# Patient Record
Sex: MALE | Race: BLACK OR AFRICAN AMERICAN | Employment: UNEMPLOYED | URBAN - METROPOLITAN AREA
[De-identification: names, ages, dates, MRNs, and addresses within clinical notes are randomized per-mention and may not be internally consistent; named-entity substitution may affect disease eponyms.]

---

## 2017-07-27 ENCOUNTER — APPOINTMENT (OUTPATIENT)
Dept: GENERAL RADIOLOGY | Age: 22
End: 2017-07-27
Attending: PHYSICIAN ASSISTANT
Payer: SELF-PAY

## 2017-07-27 ENCOUNTER — HOSPITAL ENCOUNTER (EMERGENCY)
Age: 22
Discharge: HOME | End: 2017-07-27
Attending: EMERGENCY MEDICINE
Payer: SELF-PAY

## 2017-07-27 VITALS
BODY MASS INDEX: 21 KG/M2 | DIASTOLIC BLOOD PRESSURE: 73 MMHG | RESPIRATION RATE: 18 BRPM | SYSTOLIC BLOOD PRESSURE: 134 MMHG | HEIGHT: 71 IN | TEMPERATURE: 99.1 F | OXYGEN SATURATION: 99 % | HEART RATE: 72 BPM | WEIGHT: 150 LBS

## 2017-07-27 DIAGNOSIS — M25.511 ACUTE PAIN OF RIGHT SHOULDER: Primary | ICD-10-CM

## 2017-07-27 PROCEDURE — L3670 SO ACRO/CLAV CAN WEB PRE OTS: HCPCS

## 2017-07-27 PROCEDURE — 73030 X-RAY EXAM OF SHOULDER: CPT

## 2017-07-27 PROCEDURE — 99283 EMERGENCY DEPT VISIT LOW MDM: CPT

## 2017-07-27 RX ORDER — IBUPROFEN 600 MG/1
600 TABLET ORAL
Qty: 20 TAB | Refills: 0 | Status: SHIPPED | OUTPATIENT
Start: 2017-07-27 | End: 2017-10-10

## 2017-07-27 NOTE — LETTER
[facility] EMERGENCY DEPT 
[address] [facility] [address] 
[phone] Work/School Note Date: 7/27/2017 To Whom It May concern: Debi Ponce was seen and treated today in the emergency room by the following provider(s): 
Attending Provider: Latricia Silverman DO Physician Assistant: FATMATA Jansen. Debi Ponce may return to work on 07/31/17. Sincerely, 
 
 
 
 
Triny Landers

## 2017-07-28 NOTE — DISCHARGE INSTRUCTIONS
Shoulder Pain: Care Instructions  Your Care Instructions    You can hurt your shoulder by using it too much during an activity, such as fishing or baseball. It can also happen as part of the everyday wear and tear of getting older. Shoulder injuries can be slow to heal, but your shoulder should get better with time. Your doctor may recommend a sling to rest your shoulder. If you have injured your shoulder, you may need testing and treatment. Follow-up care is a key part of your treatment and safety. Be sure to make and go to all appointments, and call your doctor if you are having problems. It's also a good idea to know your test results and keep a list of the medicines you take. How can you care for yourself at home? · Take pain medicines exactly as directed. ¨ If the doctor gave you a prescription medicine for pain, take it as prescribed. ¨ If you are not taking a prescription pain medicine, ask your doctor if you can take an over-the-counter medicine. ¨ Do not take two or more pain medicines at the same time unless the doctor told you to. Many pain medicines contain acetaminophen, which is Tylenol. Too much acetaminophen (Tylenol) can be harmful. · If your doctor recommends that you wear a sling, use it as directed. Do not take it off before your doctor tells you to. · Put ice or a cold pack on the sore area for 10 to 20 minutes at a time. Put a thin cloth between the ice and your skin. · If there is no swelling, you can put moist heat, a heating pad, or a warm cloth on your shoulder. Some doctors suggest alternating between hot and cold. · Rest your shoulder for a few days. If your doctor recommends it, you can then begin gentle exercise of the shoulder, but do not lift anything heavy. When should you call for help? Call 911 anytime you think you may need emergency care. For example, call if:  · You have chest pain or pressure. This may occur with:  ¨ Sweating. ¨ Shortness of breath.   ¨ Nausea or vomiting. ¨ Pain that spreads from the chest to the neck, jaw, or one or both shoulders or arms. ¨ Dizziness or lightheadedness. ¨ A fast or uneven pulse. After calling 911, chew 1 adult-strength aspirin. Wait for an ambulance. Do not try to drive yourself. · Your arm or hand is cool or pale or changes color. Call your doctor now or seek immediate medical care if:  · You have signs of infection, such as:  ¨ Increased pain, swelling, warmth, or redness in your shoulder. ¨ Red streaks leading from a place on your shoulder. ¨ Pus draining from an area of your shoulder. ¨ Swollen lymph nodes in your neck, armpits, or groin. ¨ A fever. Watch closely for changes in your health, and be sure to contact your doctor if:  · You cannot use your shoulder. · Your shoulder does not get better as expected. Where can you learn more? Go to http://rogelio-derek.info/. Enter E454 in the search box to learn more about \"Shoulder Pain: Care Instructions. \"  Current as of: March 21, 2017  Content Version: 11.3  © 9911-6207 [employer]. Care instructions adapted under license by [employer] (which disclaims liability or warranty for this information). If you have questions about a medical condition or this instruction, always ask your healthcare professional. [place] [address] any warranty or liability for your use of this information.

## 2017-07-28 NOTE — ED PROVIDER NOTES
[facility]      25 y.o. male presents to the ED c/o right shoulder pain since this morning. Pt states he was playing basketball when the ball hit his right arm. States he was not able to move it for a while and it was stuck in one position. Says it suddenly popped back into place and he was able to move it again. He has never had a prior dislocation, but thinks it was dislocated today. Has not taken anything for pain. Denies fever, numbness, weakness, other injury, or other symptoms. No current facility-administered medications for this encounter. Current Outpatient Prescriptions   Medication Sig    ibuprofen (MOTRIN) 600 mg tablet Take 1 Tab by mouth every six (6) hours as needed for Pain. Social History     Social History    Marital status: SINGLE     Spouse name: N/A    Number of children: N/A    Years of education: N/A     Occupational History    Not on file. Social History Main Topics    Smoking status: Current Every Day Smoker    Smokeless tobacco: Never Used    Alcohol use No    Drug use: No    Sexual activity: Not on file     Other Topics Concern    Not on file     Social History Narrative    No narrative on file       No Known Allergies    Patient's primary care provider (as noted in EPIC):  None    REVIEW OF SYSTEMS:    Constitutional:  Negative for diaphoresis. Musculoskeletal:  + right shoulder pain. Negative for back pain. Skin:  Negative for pallor. Neurological:  Negative for weakness. Psychiatric:  Negative for hallucinations. All other systems negative as reviewed. Visit Vitals    /73 (BP 1 Location: Left arm, BP Patient Position: At rest)    Pulse 72    Temp 99.1 °F (37.3 °C)    Resp 18    Ht 5' 11\" (1.803 m)    Wt 68 kg (150 lb)    SpO2 99%    BMI 20.92 kg/m2       PHYSICAL EXAM:    CONSTITUTIONAL:  Alert, in no apparent distress;  well developed;  well nourished. HEAD:  Normocephalic, atraumatic. EYES:  EOMI. Non-icteric sclera. Normal conjunctiva. ENTM:  mucous membranes moist.  NECK:  Supple  RESPIRATORY:  Chest clear, equal breath sounds, good air movement. Without wheezes, rhonchi or rales. CARDIOVASCULAR:  Regular rate and rhythm. No murmurs, rubs, or gallops. BACK:  Non-tender. UPPER EXT: Guarding right shoulder; no TTP, full ROM, 5/5  strength; NVI distally. Normal inspection. NEURO:  Moves all four extremities, and grossly normal motor exam.  SKIN:  No rashes;  Normal for age. PSYCH:  Alert and normal affect. DIFFERENTIAL DIAGNOSES/ MEDICAL DECISION MAKING:  Contusion, sprain, dislocation, fracture, ligamentous tear/ disruption or a combination of the above. Xray: NAD    IMPRESSION AND MEDICAL DECISION MAKING:  Based upon the patients presentation with noted HPI and PE, along with the work up done in the emergency department, I believe that the patient is having noted shoulder pain. He could have had a dislocation, however, it is back into place now and has a negative xray. Will place in sling and have f/u with ortho. Motrin for pain. SPLINT NOTE: 10:37 PM 7/27/2017  Splint applied as ordered by: Cory Prater RN  TYPE of Splint: Sling  Location:Right  Neurovascular intact prior to application of splint. Neurovascular intact after application of splint. Splint in acceptable position of comfort. FATMATA Lee     Diagnosis:   1. Acute pain of right shoulder      Disposition: Discharge    Follow-up Information     Follow up With Details Comments [address] [facility]. In 3 days  [facility] [address] [facility]. [place]     [address] [facility] EMERGENCY DEPT  If symptoms worsen [address] [facility]  [phone]          Patient's Medications   Start Taking    IBUPROFEN (MOTRIN) 600 MG TABLET    Take 1 Tab by mouth every six (6) hours as needed for Pain.    Continue Taking    No medications on file These Medications have changed    No medications on file   Stop Taking    No medications on file     FATMATA Jansen

## 2017-07-28 NOTE — ED TRIAGE NOTES
Patient states right shoulder popped out of place this morning while playing basketball and then popped back in place later in the day. C/o right shoulder pain.

## 2017-10-10 ENCOUNTER — HOSPITAL ENCOUNTER (EMERGENCY)
Age: 22
Discharge: HOME | End: 2017-10-10
Attending: EMERGENCY MEDICINE
Payer: SELF-PAY

## 2017-10-10 VITALS
DIASTOLIC BLOOD PRESSURE: 64 MMHG | HEIGHT: 70 IN | TEMPERATURE: 98.4 F | RESPIRATION RATE: 20 BRPM | HEART RATE: 74 BPM | SYSTOLIC BLOOD PRESSURE: 110 MMHG | BODY MASS INDEX: 19.33 KG/M2 | WEIGHT: 135 LBS | OXYGEN SATURATION: 98 %

## 2017-10-10 DIAGNOSIS — R68.83 CHILLS: Primary | ICD-10-CM

## 2017-10-10 PROCEDURE — 99283 EMERGENCY DEPT VISIT LOW MDM: CPT

## 2017-10-10 PROCEDURE — 74011250637 HC RX REV CODE- 250/637: Performed by: EMERGENCY MEDICINE

## 2017-10-10 RX ORDER — IBUPROFEN 400 MG/1
800 TABLET ORAL
Status: COMPLETED | OUTPATIENT
Start: 2017-10-10 | End: 2017-10-10

## 2017-10-10 RX ORDER — IBUPROFEN 600 MG/1
600 TABLET ORAL
Qty: 18 TAB | Refills: 0 | Status: SHIPPED | OUTPATIENT
Start: 2017-10-10

## 2017-10-10 RX ADMIN — IBUPROFEN 800 MG: 400 TABLET, FILM COATED ORAL at 01:52

## 2017-10-10 NOTE — DISCHARGE INSTRUCTIONS
Return for pain, any fever, return of chills, shortness of breath, vomiting, decreased fluid intake, weakness, numbness, dizziness, or any change or concerns. Avoid alcohol as we discussed.

## 2017-10-10 NOTE — ED NOTES
Pt instructed to rest/hydrate, avoid ETOH, to follow up with a PCP, and to return to ED for worsening/other concerns. Pt asleep; rouses easily to voice. No distress noted.

## 2017-10-10 NOTE — ED PROVIDER NOTES
HPI Comments: 1:49 AM: Darvin Quiroz is a 23y.o. year old male with hx of pediatric asthma but no currently medical hx, presenting to the ED c/o intermittent chills x 3 days. Pt notes that he had a few ETOH drinks today, but denies drug use. Pt denies fever, cough, congestion, CP, or abd pain. No other Sx or complaints. Says sx's worse after etoh use for his birthday tonight. The history is provided by the patient. No  was used. No past medical history on file. No past surgical history on file. No family history on file. Social History     Social History    Marital status: SINGLE     Spouse name: N/A    Number of children: N/A    Years of education: N/A     Occupational History    Not on file. Social History Main Topics    Smoking status: Current Every Day Smoker    Smokeless tobacco: Never Used    Alcohol use No    Drug use: No    Sexual activity: Not on file     Other Topics Concern    Not on file     Social History Narrative    No narrative on file         ALLERGIES: Review of patient's allergies indicates no known allergies. Review of Systems   Constitutional: Positive for chills. Negative for diaphoresis and fever. Respiratory: Negative for cough and shortness of breath. Cardiovascular: Negative for chest pain. Gastrointestinal: Negative for abdominal pain, diarrhea, nausea and vomiting. Musculoskeletal: Negative for back pain. Neurological: Negative for dizziness. All other systems reviewed and are negative. Vitals:    10/10/17 0124 10/10/17 0239   BP: 122/63 110/64   Pulse: 95 74   Resp: 16 20   Temp: 98.8 °F (37.1 °C) 98.4 °F (36.9 °C)   SpO2: 96% 98%   Weight: 61.2 kg (135 lb)    Height: 5' 10\" (1.778 m)             Physical Exam   Constitutional: He is oriented to person, place, and time. He appears well-developed and well-nourished. HENT:   Head: Normocephalic and atraumatic.    Eyes: Pupils are equal, round, and reactive to light.   Neck: Neck supple. Cardiovascular: Normal rate. No murmur heard. Pulmonary/Chest: Effort normal. He has no wheezes. rr 12   Abdominal: Soft. There is no tenderness. Musculoskeletal: He exhibits no tenderness. Neurological: He is alert and oriented to person, place, and time. Skin: No pallor. Nursing note and vitals reviewed. [facility]  ED Course       Procedures    Vitals:  No data found. Medications ordered:   Medications   ibuprofen (MOTRIN) tablet 800 mg (800 mg Oral Given 10/10/17 0152)         Lab findings:  No results found for this or any previous visit (from the past 12 hour(s)). X-Ray, CT or other radiology findings or impressions:  No orders to display       Progress notes, Consult notes or additional Procedure notes:   Pt given blanket, motrin. On recheck, no sx's, says feels much better. Af, nl vitals. Pt declines blood draw or further w/u, wants dc. Detailed ret inst given. Not c/w sepsis/bacteremia. No emc. Detailed ret inst given. Disposition:  Diagnosis:   1.  Chills        Disposition: home    Follow-up Information     Follow up With Details Comments [address] [place] [address] Schedule an appointment as soon as possible for a visit  Bethel Parr [address] [place] [address] [address]    [address] [place] [address]  [phone]           Discharge Medication List as of 10/10/2017  2:43 AM      CONTINUE these medications which have CHANGED    Details   ibuprofen (MOTRIN) 600 mg tablet Take 1 Tab by mouth every six (6) hours as needed for Pain., Print, Disp-18 Tab, R-0           Scribe Attestation      Kathrene Saint and Elverna Lanius Lorinda Dandy) Hassie Dash acting as scribes for and in the presence of Amando Eric MD      October 10, 2017 at 1:48 AM       Provider Attestation:      I personally performed the services described in the documentation, reviewed the documentation, as recorded by the scribe in my presence, and it accurately and completely records my words and actions.  October 10, 2017 at 1:48 AM - Kartik Linn MD

## 2017-10-10 NOTE — ED NOTES
Footies and warm blankets x 2 provided. Pt taking PO fluid/food. Affect calm/drowsy, respirations regular/non labored/calm. Rails up x 2 with call light in reach. No distress noted.